# Patient Record
Sex: MALE | Race: WHITE | NOT HISPANIC OR LATINO | URBAN - METROPOLITAN AREA
[De-identification: names, ages, dates, MRNs, and addresses within clinical notes are randomized per-mention and may not be internally consistent; named-entity substitution may affect disease eponyms.]

---

## 2018-02-05 ENCOUNTER — CONSULTATION (OUTPATIENT)
Dept: URBAN - METROPOLITAN AREA CLINIC 19 | Facility: CLINIC | Age: 68
End: 2018-02-05

## 2018-02-05 DIAGNOSIS — H35.371: ICD-10-CM

## 2018-02-05 DIAGNOSIS — H35.412: ICD-10-CM

## 2018-02-05 DIAGNOSIS — H43.812: ICD-10-CM

## 2018-02-05 DIAGNOSIS — H33.021: ICD-10-CM

## 2018-02-05 PROCEDURE — 92226 OPHTHALMOSCOPY (SUB): CPT

## 2018-02-05 PROCEDURE — 92014 COMPRE OPH EXAM EST PT 1/>: CPT

## 2018-02-05 PROCEDURE — G8427 DOCREV CUR MEDS BY ELIG CLIN: HCPCS

## 2018-02-05 PROCEDURE — 92134 CPTRZ OPH DX IMG PST SGM RTA: CPT

## 2018-02-05 PROCEDURE — 1036F TOBACCO NON-USER: CPT

## 2018-02-05 ASSESSMENT — TONOMETRY
OS_IOP_MMHG: 12
OD_IOP_MMHG: 13

## 2018-02-05 ASSESSMENT — VISUAL ACUITY
OD_CC: 20/40+3
OS_PH: 20/30-2
OS_CC: 20/50

## 2018-11-19 ENCOUNTER — FOLLOW UP (OUTPATIENT)
Dept: URBAN - METROPOLITAN AREA CLINIC 19 | Facility: CLINIC | Age: 68
End: 2018-11-19

## 2018-11-19 DIAGNOSIS — H25.812: ICD-10-CM

## 2018-11-19 DIAGNOSIS — H35.362: ICD-10-CM

## 2018-11-19 DIAGNOSIS — H33.021: ICD-10-CM

## 2018-11-19 DIAGNOSIS — H35.371: ICD-10-CM

## 2018-11-19 DIAGNOSIS — H35.412: ICD-10-CM

## 2018-11-19 DIAGNOSIS — H43.812: ICD-10-CM

## 2018-11-19 PROCEDURE — 92226 OPHTHALMOSCOPY (SUB): CPT

## 2018-11-19 PROCEDURE — G8427 DOCREV CUR MEDS BY ELIG CLIN: HCPCS

## 2018-11-19 PROCEDURE — 92134 CPTRZ OPH DX IMG PST SGM RTA: CPT

## 2018-11-19 PROCEDURE — 1036F TOBACCO NON-USER: CPT

## 2018-11-19 PROCEDURE — 92014 COMPRE OPH EXAM EST PT 1/>: CPT

## 2018-11-19 ASSESSMENT — TONOMETRY
OD_IOP_MMHG: 10
OS_IOP_MMHG: 8

## 2018-11-19 ASSESSMENT — VISUAL ACUITY
OD_CC: 20/40-2
OS_CC: 20/30-2
OS_PH: 20/20

## 2019-12-10 ENCOUNTER — FOLLOW UP (OUTPATIENT)
Dept: URBAN - METROPOLITAN AREA CLINIC 19 | Facility: CLINIC | Age: 69
End: 2019-12-10

## 2019-12-10 DIAGNOSIS — H35.362: ICD-10-CM

## 2019-12-10 DIAGNOSIS — H35.371: ICD-10-CM

## 2019-12-10 DIAGNOSIS — H43.812: ICD-10-CM

## 2019-12-10 DIAGNOSIS — H33.021: ICD-10-CM

## 2019-12-10 DIAGNOSIS — H35.412: ICD-10-CM

## 2019-12-10 PROCEDURE — 92134 CPTRZ OPH DX IMG PST SGM RTA: CPT

## 2019-12-10 PROCEDURE — 92226 OPHTHALMOSCOPY (SUB): CPT

## 2019-12-10 PROCEDURE — 92014 COMPRE OPH EXAM EST PT 1/>: CPT

## 2019-12-10 ASSESSMENT — TONOMETRY
OS_IOP_MMHG: 15
OD_IOP_MMHG: 14

## 2019-12-10 ASSESSMENT — VISUAL ACUITY
OS_CC: 20/30-3
OD_CC: 20/40-2
OS_PH: 20/20-3

## 2020-12-14 ENCOUNTER — FOLLOW UP (OUTPATIENT)
Dept: URBAN - METROPOLITAN AREA CLINIC 19 | Facility: CLINIC | Age: 70
End: 2020-12-14

## 2020-12-14 DIAGNOSIS — H35.362: ICD-10-CM

## 2020-12-14 DIAGNOSIS — H35.371: ICD-10-CM

## 2020-12-14 DIAGNOSIS — H35.412: ICD-10-CM

## 2020-12-14 DIAGNOSIS — H26.491: ICD-10-CM

## 2020-12-14 DIAGNOSIS — H43.812: ICD-10-CM

## 2020-12-14 DIAGNOSIS — H33.021: ICD-10-CM

## 2020-12-14 PROCEDURE — 92134 CPTRZ OPH DX IMG PST SGM RTA: CPT

## 2020-12-14 PROCEDURE — 92014 COMPRE OPH EXAM EST PT 1/>: CPT

## 2020-12-14 PROCEDURE — 92201 OPSCPY EXTND RTA DRAW UNI/BI: CPT

## 2020-12-14 ASSESSMENT — VISUAL ACUITY
OD_CC: 20/50-2
OS_CC: 20/25-3
OS_PH: 20/25

## 2020-12-14 ASSESSMENT — TONOMETRY
OD_IOP_MMHG: 15
OS_IOP_MMHG: 14

## 2021-04-28 ENCOUNTER — INPATIENT (INPATIENT)
Facility: HOSPITAL | Age: 71
LOS: 1 days | Discharge: HOME | DRG: 871 | End: 2021-04-30
Attending: HOSPITALIST | Admitting: STUDENT IN AN ORGANIZED HEALTH CARE EDUCATION/TRAINING PROGRAM
Payer: MEDICARE

## 2021-04-28 VITALS
WEIGHT: 190.04 LBS | HEART RATE: 114 BPM | TEMPERATURE: 100 F | DIASTOLIC BLOOD PRESSURE: 58 MMHG | RESPIRATION RATE: 20 BRPM | SYSTOLIC BLOOD PRESSURE: 84 MMHG | OXYGEN SATURATION: 94 %

## 2021-04-28 LAB
ALBUMIN SERPL ELPH-MCNC: 3.6 G/DL — SIGNIFICANT CHANGE UP (ref 3.4–5)
ALP SERPL-CCNC: 77 U/L — SIGNIFICANT CHANGE UP (ref 40–120)
ALT FLD-CCNC: 49 U/L — HIGH (ref 12–42)
ANION GAP SERPL CALC-SCNC: 11 MMOL/L — SIGNIFICANT CHANGE UP (ref 9–16)
APPEARANCE UR: CLEAR — SIGNIFICANT CHANGE UP
APTT BLD: 30.5 SEC — SIGNIFICANT CHANGE UP (ref 27.5–35.5)
AST SERPL-CCNC: 41 U/L — HIGH (ref 15–37)
BACTERIA # UR AUTO: PRESENT /HPF
BASOPHILS # BLD AUTO: 0.03 K/UL — SIGNIFICANT CHANGE UP (ref 0–0.2)
BASOPHILS NFR BLD AUTO: 0.3 % — SIGNIFICANT CHANGE UP (ref 0–2)
BILIRUB SERPL-MCNC: 1 MG/DL — SIGNIFICANT CHANGE UP (ref 0.2–1.2)
BILIRUB UR-MCNC: ABNORMAL
BUN SERPL-MCNC: 18 MG/DL — SIGNIFICANT CHANGE UP (ref 7–23)
CALCIUM SERPL-MCNC: 9.8 MG/DL — SIGNIFICANT CHANGE UP (ref 8.5–10.5)
CHLORIDE SERPL-SCNC: 102 MMOL/L — SIGNIFICANT CHANGE UP (ref 96–108)
CO2 SERPL-SCNC: 27 MMOL/L — SIGNIFICANT CHANGE UP (ref 22–31)
COLOR SPEC: YELLOW — SIGNIFICANT CHANGE UP
COMMENT - URINE: SIGNIFICANT CHANGE UP
CREAT SERPL-MCNC: 1.69 MG/DL — HIGH (ref 0.5–1.3)
DIFF PNL FLD: ABNORMAL
EOSINOPHIL # BLD AUTO: 0 K/UL — SIGNIFICANT CHANGE UP (ref 0–0.5)
EOSINOPHIL NFR BLD AUTO: 0 % — SIGNIFICANT CHANGE UP (ref 0–6)
EPI CELLS # UR: SIGNIFICANT CHANGE UP /HPF (ref 0–5)
GLUCOSE SERPL-MCNC: 142 MG/DL — HIGH (ref 70–99)
GLUCOSE UR QL: NEGATIVE — SIGNIFICANT CHANGE UP
HCT VFR BLD CALC: 45.2 % — SIGNIFICANT CHANGE UP (ref 39–50)
HGB BLD-MCNC: 15.6 G/DL — SIGNIFICANT CHANGE UP (ref 13–17)
HYALINE CASTS # UR AUTO: SIGNIFICANT CHANGE UP /LPF (ref 0–2)
IMM GRANULOCYTES NFR BLD AUTO: 0.5 % — SIGNIFICANT CHANGE UP (ref 0–1.5)
INR BLD: 1.63 — HIGH (ref 0.88–1.16)
KETONES UR-MCNC: NEGATIVE — SIGNIFICANT CHANGE UP
LACTATE SERPL-SCNC: 3.1 MMOL/L — HIGH (ref 0.4–2)
LACTATE SERPL-SCNC: 3.4 MMOL/L — HIGH (ref 0.4–2)
LACTATE SERPL-SCNC: 4.9 MMOL/L — CRITICAL HIGH (ref 0.4–2)
LEUKOCYTE ESTERASE UR-ACNC: ABNORMAL
LYMPHOCYTES # BLD AUTO: 0.4 K/UL — LOW (ref 1–3.3)
LYMPHOCYTES # BLD AUTO: 3.9 % — LOW (ref 13–44)
MAGNESIUM SERPL-MCNC: 1.5 MG/DL — LOW (ref 1.6–2.6)
MCHC RBC-ENTMCNC: 31.3 PG — SIGNIFICANT CHANGE UP (ref 27–34)
MCHC RBC-ENTMCNC: 34.5 GM/DL — SIGNIFICANT CHANGE UP (ref 32–36)
MCV RBC AUTO: 90.6 FL — SIGNIFICANT CHANGE UP (ref 80–100)
MONOCYTES # BLD AUTO: 0.28 K/UL — SIGNIFICANT CHANGE UP (ref 0–0.9)
MONOCYTES NFR BLD AUTO: 2.7 % — SIGNIFICANT CHANGE UP (ref 2–14)
NEUTROPHILS # BLD AUTO: 9.46 K/UL — HIGH (ref 1.8–7.4)
NEUTROPHILS NFR BLD AUTO: 92.6 % — HIGH (ref 43–77)
NITRITE UR-MCNC: POSITIVE
NRBC # BLD: 0 /100 WBCS — SIGNIFICANT CHANGE UP (ref 0–0)
PH UR: 6 — SIGNIFICANT CHANGE UP (ref 5–8)
PLATELET # BLD AUTO: 120 K/UL — LOW (ref 150–400)
POTASSIUM SERPL-MCNC: 3 MMOL/L — LOW (ref 3.5–5.3)
POTASSIUM SERPL-SCNC: 3 MMOL/L — LOW (ref 3.5–5.3)
PROT SERPL-MCNC: 6.8 G/DL — SIGNIFICANT CHANGE UP (ref 6.4–8.2)
PROT UR-MCNC: 30 MG/DL
PROTHROM AB SERPL-ACNC: 19.1 SEC — HIGH (ref 10.6–13.6)
RBC # BLD: 4.99 M/UL — SIGNIFICANT CHANGE UP (ref 4.2–5.8)
RBC # FLD: 12.9 % — SIGNIFICANT CHANGE UP (ref 10.3–14.5)
RBC CASTS # UR COMP ASSIST: ABNORMAL /HPF
SARS-COV-2 RNA SPEC QL NAA+PROBE: SIGNIFICANT CHANGE UP
SODIUM SERPL-SCNC: 140 MMOL/L — SIGNIFICANT CHANGE UP (ref 132–145)
SP GR SPEC: 1.02 — SIGNIFICANT CHANGE UP (ref 1–1.03)
TROPONIN I SERPL-MCNC: 0.35 NG/ML — HIGH (ref 0.02–0.06)
UROBILINOGEN FLD QL: 0.2 E.U./DL — SIGNIFICANT CHANGE UP
WBC # BLD: 10.22 K/UL — SIGNIFICANT CHANGE UP (ref 3.8–10.5)
WBC # FLD AUTO: 10.22 K/UL — SIGNIFICANT CHANGE UP (ref 3.8–10.5)
WBC UR QL: ABNORMAL /HPF

## 2021-04-28 PROCEDURE — 93010 ELECTROCARDIOGRAM REPORT: CPT

## 2021-04-28 PROCEDURE — 74176 CT ABD & PELVIS W/O CONTRAST: CPT | Mod: 26,MH

## 2021-04-28 PROCEDURE — 99223 1ST HOSP IP/OBS HIGH 75: CPT | Mod: GC

## 2021-04-28 PROCEDURE — 71045 X-RAY EXAM CHEST 1 VIEW: CPT | Mod: 26

## 2021-04-28 PROCEDURE — 99291 CRITICAL CARE FIRST HOUR: CPT

## 2021-04-28 RX ORDER — CEFTRIAXONE 500 MG/1
1000 INJECTION, POWDER, FOR SOLUTION INTRAMUSCULAR; INTRAVENOUS ONCE
Refills: 0 | Status: COMPLETED | OUTPATIENT
Start: 2021-04-28 | End: 2021-04-28

## 2021-04-28 RX ORDER — VANCOMYCIN HCL 1 G
1500 VIAL (EA) INTRAVENOUS ONCE
Refills: 0 | Status: DISCONTINUED | OUTPATIENT
Start: 2021-04-28 | End: 2021-04-28

## 2021-04-28 RX ORDER — VANCOMYCIN HCL 1 G
1500 VIAL (EA) INTRAVENOUS ONCE
Refills: 0 | Status: COMPLETED | OUTPATIENT
Start: 2021-04-28 | End: 2021-04-28

## 2021-04-28 RX ORDER — CEFTRIAXONE 500 MG/1
1 INJECTION, POWDER, FOR SOLUTION INTRAMUSCULAR; INTRAVENOUS ONCE
Refills: 0 | Status: DISCONTINUED | OUTPATIENT
Start: 2021-04-28 | End: 2021-04-28

## 2021-04-28 RX ORDER — SODIUM CHLORIDE 9 MG/ML
2700 INJECTION INTRAMUSCULAR; INTRAVENOUS; SUBCUTANEOUS ONCE
Refills: 0 | Status: COMPLETED | OUTPATIENT
Start: 2021-04-28 | End: 2021-04-28

## 2021-04-28 RX ORDER — SODIUM CHLORIDE 9 MG/ML
2000 INJECTION INTRAMUSCULAR; INTRAVENOUS; SUBCUTANEOUS ONCE
Refills: 0 | Status: COMPLETED | OUTPATIENT
Start: 2021-04-28 | End: 2021-04-28

## 2021-04-28 RX ORDER — ACETAMINOPHEN 500 MG
975 TABLET ORAL ONCE
Refills: 0 | Status: COMPLETED | OUTPATIENT
Start: 2021-04-28 | End: 2021-04-28

## 2021-04-28 RX ORDER — SODIUM CHLORIDE 9 MG/ML
1000 INJECTION INTRAMUSCULAR; INTRAVENOUS; SUBCUTANEOUS ONCE
Refills: 0 | Status: COMPLETED | OUTPATIENT
Start: 2021-04-28 | End: 2021-04-28

## 2021-04-28 RX ORDER — MAGNESIUM SULFATE 500 MG/ML
2 VIAL (ML) INJECTION ONCE
Refills: 0 | Status: COMPLETED | OUTPATIENT
Start: 2021-04-28 | End: 2021-04-28

## 2021-04-28 RX ORDER — POTASSIUM CHLORIDE 20 MEQ
40 PACKET (EA) ORAL ONCE
Refills: 0 | Status: COMPLETED | OUTPATIENT
Start: 2021-04-28 | End: 2021-04-28

## 2021-04-28 RX ORDER — VANCOMYCIN HCL 1 G
1500 VIAL (EA) INTRAVENOUS EVERY 12 HOURS
Refills: 0 | Status: DISCONTINUED | OUTPATIENT
Start: 2021-04-28 | End: 2021-04-28

## 2021-04-28 RX ORDER — PIPERACILLIN AND TAZOBACTAM 4; .5 G/20ML; G/20ML
3.38 INJECTION, POWDER, LYOPHILIZED, FOR SOLUTION INTRAVENOUS ONCE
Refills: 0 | Status: COMPLETED | OUTPATIENT
Start: 2021-04-28 | End: 2021-04-28

## 2021-04-28 RX ADMIN — PIPERACILLIN AND TAZOBACTAM 200 GRAM(S): 4; .5 INJECTION, POWDER, LYOPHILIZED, FOR SOLUTION INTRAVENOUS at 19:20

## 2021-04-28 RX ADMIN — SODIUM CHLORIDE 2700 MILLILITER(S): 9 INJECTION INTRAMUSCULAR; INTRAVENOUS; SUBCUTANEOUS at 15:40

## 2021-04-28 RX ADMIN — Medication 2 GRAM(S): at 17:52

## 2021-04-28 RX ADMIN — CEFTRIAXONE 100 MILLIGRAM(S): 500 INJECTION, POWDER, FOR SOLUTION INTRAMUSCULAR; INTRAVENOUS at 15:40

## 2021-04-28 RX ADMIN — Medication 50 GRAM(S): at 17:39

## 2021-04-28 RX ADMIN — CEFTRIAXONE 1000 MILLIGRAM(S): 500 INJECTION, POWDER, FOR SOLUTION INTRAMUSCULAR; INTRAVENOUS at 16:58

## 2021-04-28 RX ADMIN — Medication 975 MILLIGRAM(S): at 16:00

## 2021-04-28 RX ADMIN — Medication 975 MILLIGRAM(S): at 16:58

## 2021-04-28 RX ADMIN — Medication 500 MILLIGRAM(S): at 17:09

## 2021-04-28 RX ADMIN — SODIUM CHLORIDE 2000 MILLILITER(S): 9 INJECTION INTRAMUSCULAR; INTRAVENOUS; SUBCUTANEOUS at 20:20

## 2021-04-28 RX ADMIN — Medication 40 MILLIEQUIVALENT(S): at 16:56

## 2021-04-28 RX ADMIN — SODIUM CHLORIDE 500 MILLILITER(S): 9 INJECTION INTRAMUSCULAR; INTRAVENOUS; SUBCUTANEOUS at 21:55

## 2021-04-28 RX ADMIN — SODIUM CHLORIDE 2700 MILLILITER(S): 9 INJECTION INTRAMUSCULAR; INTRAVENOUS; SUBCUTANEOUS at 17:01

## 2021-04-28 RX ADMIN — Medication 1500 MILLIGRAM(S): at 17:10

## 2021-04-28 NOTE — ED PROVIDER NOTE - PROGRESS NOTE DETAILS
BP improved.  HR remains elevated.  Subjectively feels better than when he arrived.  Labs and imaging reviewed.  Normal WBC with left shift.  LANNY, elevated INR, troponin elevated.  Lactate 3.4.  Picture consistent with severe sepsis.  Antibiotic coverage expanded to zosyn and vancomycin. On reexamination, sleeping comfortably.  BP remains stable, HR improved.  Pending IVF bolus completion before repeat lactate.  Potassium and magnesium replaced.  Pending abdominal/pelvic CT imaging although the patient does not endorse flank or abdominal pain.  Bedside US with bladder normal size with thickened wall.  UO scant since arrival. On reexamination, sleeping comfortably.  BP remains stable, HR improved.  Pending IVF bolus completion before repeat lactate.  Potassium and magnesium replaced.  Pending abdominal/pelvic CT imaging although the patient does not endorse flank or abdominal pain.  Bedside US with bladder normal size with thickened wall.  UO scant since arrival.  MAP 75.  Will contact ICU for Tele admission. Reviewed with Dr. Garza.  Accepted to [facility] by Dr. Smalls

## 2021-04-28 NOTE — ED ADULT NURSE NOTE - OBJECTIVE STATEMENT
BIBA c/o fatigue, weakness, fevers and chills/rigors since this morning. as per daughter, acting slightly more altered. +pt appears comfortable on stretcher, in NAD and will continue to monitor. +SIRS critieria met, protocol initiated and followed

## 2021-04-28 NOTE — ED PROVIDER NOTE - OBJECTIVE STATEMENT
69 y/o M with PMHx of BPH (recently catheterized for urinary retention; Catheter removed 2 days ago and covered with Bactrim; Also taking 0.5mg Finasteride) presents to the ED for fevers, chills, and weakness. Pt woke up this morning with fevers, chills, confusion, weakness, and lack of energy. His daughter called EMS after he seemed disoriented on the phone. Pt denies pertinent PMHx, known sick contacts, and recent hospitalizations. He received the 2nd dose of the COVID-19 vaccine 2 weeks ago. Pt was in his normal state of health yesterday. Pt met sepsis criteria and sepsis code was called on arrival. As per EMS, Pt was tachycardic and hypotensive in the field without AMS.

## 2021-04-28 NOTE — ED PROVIDER NOTE - ENMT, MLM
Airway patent, Nasal mucosa clear. Mouth with normal mucosa. Airway patent, Nasal mucosa clear. Mouth with normal mucosa.  Moist mucous membranes.

## 2021-04-28 NOTE — ED ADULT TRIAGE NOTE - CHIEF COMPLAINT QUOTE
Patient complaining of weakness/fevers, recently discharged from the hospital with BPH/possible kidney stone? had a catheter placed and removed this past Monday by his urologist and hasn't been able to completely void

## 2021-04-28 NOTE — ED PROVIDER NOTE - CONSTITUTIONAL, MLM
normal... Well appearing, awake, alert, oriented to person, place, time/situation and in no apparent distress. Weak appearing, awake, alert, oriented to person, place, time/situation and in no apparent distress.

## 2021-04-28 NOTE — ED PROVIDER NOTE - NEURO NEGATIVE STATEMENT, MLM
no loss of consciousness, no gait abnormality, no headache, no sensory deficits, and no weakness. no loss of consciousness, no gait abnormality, no sensory deficits

## 2021-04-28 NOTE — ED PROVIDER NOTE - CLINICAL SUMMARY MEDICAL DECISION MAKING FREE TEXT BOX
69 y/o M with PMHx of BPH (recently catheterized for urinary retention and placed on Bactrim) presenting with fevers, chills, fatigue, and generalized weakness. Pt noted to he hypotensive and tachycardic at triage. Code sepsis was triggered. Sepsis orders were placed including weight based fluids, labs, cultures, Antibiotics and imaging. On exam, Pt is noted to be AOx3. Lungs are clear, mucous membranes are moist, and Pt is non-toxic appearing. Pt will likely need admission. Will monitor closely and escalate care if need be. 69 y/o M with PMHx of BPH (recently catheterized for urinary retention and placed on Bactrim) presenting with fevers, chills, fatigue, and generalized weakness. Pt noted to he hypotensive and tachycardic at triage. Code sepsis was triggered. Sepsis orders were placed including weight based fluids, labs, cultures, Antibiotics and imaging. On exam, Pt is noted to be AOx3. Lungs are clear, mucous membranes are moist, and is weak but non toxic appearing. Placed on cardiac monitor.  Likely admission for presumed urosepsis.

## 2021-04-28 NOTE — ED PROVIDER NOTE - NEUROLOGICAL, MLM
Alert and oriented, no focal deficits, no motor or sensory deficits. Alert and oriented, no focal deficits, no motor or sensory deficits.  Clear speech, no truncal ataxia.  Answers questions appropriately.

## 2021-04-29 DIAGNOSIS — R63.8 OTHER SYMPTOMS AND SIGNS CONCERNING FOOD AND FLUID INTAKE: ICD-10-CM

## 2021-04-29 DIAGNOSIS — R77.8 OTHER SPECIFIED ABNORMALITIES OF PLASMA PROTEINS: ICD-10-CM

## 2021-04-29 DIAGNOSIS — R19.7 DIARRHEA, UNSPECIFIED: ICD-10-CM

## 2021-04-29 DIAGNOSIS — N39.0 URINARY TRACT INFECTION, SITE NOT SPECIFIED: ICD-10-CM

## 2021-04-29 DIAGNOSIS — N40.0 BENIGN PROSTATIC HYPERPLASIA WITHOUT LOWER URINARY TRACT SYMPTOMS: ICD-10-CM

## 2021-04-29 DIAGNOSIS — N17.9 ACUTE KIDNEY FAILURE, UNSPECIFIED: ICD-10-CM

## 2021-04-29 DIAGNOSIS — A41.9 SEPSIS, UNSPECIFIED ORGANISM: ICD-10-CM

## 2021-04-29 DIAGNOSIS — R41.82 ALTERED MENTAL STATUS, UNSPECIFIED: ICD-10-CM

## 2021-04-29 DIAGNOSIS — D69.6 THROMBOCYTOPENIA, UNSPECIFIED: ICD-10-CM

## 2021-04-29 LAB
ALBUMIN SERPL ELPH-MCNC: 2.9 G/DL — LOW (ref 3.3–5)
ALP SERPL-CCNC: 63 U/L — SIGNIFICANT CHANGE UP (ref 40–120)
ALT FLD-CCNC: 51 U/L — HIGH (ref 10–45)
ANION GAP SERPL CALC-SCNC: 9 MMOL/L — SIGNIFICANT CHANGE UP (ref 5–17)
ANISOCYTOSIS BLD QL: SLIGHT — SIGNIFICANT CHANGE UP
AST SERPL-CCNC: 71 U/L — HIGH (ref 10–40)
BASOPHILS # BLD AUTO: 0 K/UL — SIGNIFICANT CHANGE UP (ref 0–0.2)
BASOPHILS NFR BLD AUTO: 0 % — SIGNIFICANT CHANGE UP (ref 0–2)
BILIRUB SERPL-MCNC: 0.7 MG/DL — SIGNIFICANT CHANGE UP (ref 0.2–1.2)
BUN SERPL-MCNC: 17 MG/DL — SIGNIFICANT CHANGE UP (ref 7–23)
BURR CELLS BLD QL SMEAR: PRESENT — SIGNIFICANT CHANGE UP
CALCIUM SERPL-MCNC: 8.2 MG/DL — LOW (ref 8.4–10.5)
CHLORIDE SERPL-SCNC: 112 MMOL/L — HIGH (ref 96–108)
CK MB CFR SERPL CALC: 3.8 NG/ML — SIGNIFICANT CHANGE UP (ref 0–6.7)
CK SERPL-CCNC: 70 U/L — SIGNIFICANT CHANGE UP (ref 30–200)
CO2 SERPL-SCNC: 20 MMOL/L — LOW (ref 22–31)
CREAT SERPL-MCNC: 1.16 MG/DL — SIGNIFICANT CHANGE UP (ref 0.5–1.3)
CULTURE RESULTS: SIGNIFICANT CHANGE UP
EOSINOPHIL # BLD AUTO: 0.16 K/UL — SIGNIFICANT CHANGE UP (ref 0–0.5)
EOSINOPHIL NFR BLD AUTO: 0.9 % — SIGNIFICANT CHANGE UP (ref 0–6)
GIANT PLATELETS BLD QL SMEAR: PRESENT — SIGNIFICANT CHANGE UP
GLUCOSE SERPL-MCNC: 111 MG/DL — HIGH (ref 70–99)
HCT VFR BLD CALC: 40.5 % — SIGNIFICANT CHANGE UP (ref 39–50)
HGB BLD-MCNC: 13 G/DL — SIGNIFICANT CHANGE UP (ref 13–17)
HYPOCHROMIA BLD QL: SLIGHT — SIGNIFICANT CHANGE UP
LACTATE SERPL-SCNC: 1.7 MMOL/L — SIGNIFICANT CHANGE UP (ref 0.5–2)
LYMPHOCYTES # BLD AUTO: 0.81 K/UL — LOW (ref 1–3.3)
LYMPHOCYTES # BLD AUTO: 4.6 % — LOW (ref 13–44)
MAGNESIUM SERPL-MCNC: 1.6 MG/DL — SIGNIFICANT CHANGE UP (ref 1.6–2.6)
MANUAL SMEAR VERIFICATION: SIGNIFICANT CHANGE UP
MCHC RBC-ENTMCNC: 30.9 PG — SIGNIFICANT CHANGE UP (ref 27–34)
MCHC RBC-ENTMCNC: 32.1 GM/DL — SIGNIFICANT CHANGE UP (ref 32–36)
MCV RBC AUTO: 96.2 FL — SIGNIFICANT CHANGE UP (ref 80–100)
MONOCYTES # BLD AUTO: 0.32 K/UL — SIGNIFICANT CHANGE UP (ref 0–0.9)
MONOCYTES NFR BLD AUTO: 1.8 % — LOW (ref 2–14)
NEUTROPHILS # BLD AUTO: 16.3 K/UL — HIGH (ref 1.8–7.4)
NEUTROPHILS NFR BLD AUTO: 88.2 % — HIGH (ref 43–77)
NEUTS BAND # BLD: 4.5 % — SIGNIFICANT CHANGE UP (ref 0–8)
OVALOCYTES BLD QL SMEAR: SLIGHT — SIGNIFICANT CHANGE UP
PHOSPHATE SERPL-MCNC: 2.4 MG/DL — LOW (ref 2.5–4.5)
PLAT MORPH BLD: ABNORMAL
PLATELET # BLD AUTO: 93 K/UL — LOW (ref 150–400)
POIKILOCYTOSIS BLD QL AUTO: SLIGHT — SIGNIFICANT CHANGE UP
POLYCHROMASIA BLD QL SMEAR: SLIGHT — SIGNIFICANT CHANGE UP
POTASSIUM SERPL-MCNC: 4.3 MMOL/L — SIGNIFICANT CHANGE UP (ref 3.5–5.3)
POTASSIUM SERPL-SCNC: 4.3 MMOL/L — SIGNIFICANT CHANGE UP (ref 3.5–5.3)
PROT SERPL-MCNC: 5.1 G/DL — LOW (ref 6–8.3)
RBC # BLD: 4.21 M/UL — SIGNIFICANT CHANGE UP (ref 4.2–5.8)
RBC # FLD: 13.4 % — SIGNIFICANT CHANGE UP (ref 10.3–14.5)
RBC BLD AUTO: ABNORMAL
SCHISTOCYTES BLD QL AUTO: SLIGHT — SIGNIFICANT CHANGE UP
SODIUM SERPL-SCNC: 141 MMOL/L — SIGNIFICANT CHANGE UP (ref 135–145)
SPECIMEN SOURCE: SIGNIFICANT CHANGE UP
SPHEROCYTES BLD QL SMEAR: SLIGHT — SIGNIFICANT CHANGE UP
TROPONIN T SERPL-MCNC: 0.02 NG/ML — HIGH (ref 0–0.01)
TROPONIN T SERPL-MCNC: 0.02 NG/ML — HIGH (ref 0–0.01)
WBC # BLD: 17.58 K/UL — HIGH (ref 3.8–10.5)
WBC # FLD AUTO: 17.58 K/UL — HIGH (ref 3.8–10.5)

## 2021-04-29 PROCEDURE — 99233 SBSQ HOSP IP/OBS HIGH 50: CPT | Mod: GC

## 2021-04-29 PROCEDURE — 93010 ELECTROCARDIOGRAM REPORT: CPT

## 2021-04-29 RX ORDER — PIPERACILLIN AND TAZOBACTAM 4; .5 G/20ML; G/20ML
3.38 INJECTION, POWDER, LYOPHILIZED, FOR SOLUTION INTRAVENOUS EVERY 6 HOURS
Refills: 0 | Status: DISCONTINUED | OUTPATIENT
Start: 2021-04-29 | End: 2021-04-29

## 2021-04-29 RX ORDER — PIPERACILLIN AND TAZOBACTAM 4; .5 G/20ML; G/20ML
3.38 INJECTION, POWDER, LYOPHILIZED, FOR SOLUTION INTRAVENOUS ONCE
Refills: 0 | Status: COMPLETED | OUTPATIENT
Start: 2021-04-29 | End: 2021-04-29

## 2021-04-29 RX ORDER — FINASTERIDE 5 MG/1
5 TABLET, FILM COATED ORAL DAILY
Refills: 0 | Status: DISCONTINUED | OUTPATIENT
Start: 2021-04-29 | End: 2021-04-30

## 2021-04-29 RX ORDER — ENOXAPARIN SODIUM 100 MG/ML
40 INJECTION SUBCUTANEOUS DAILY
Refills: 0 | Status: DISCONTINUED | OUTPATIENT
Start: 2021-04-29 | End: 2021-04-30

## 2021-04-29 RX ORDER — PIPERACILLIN AND TAZOBACTAM 4; .5 G/20ML; G/20ML
4.5 INJECTION, POWDER, LYOPHILIZED, FOR SOLUTION INTRAVENOUS EVERY 6 HOURS
Refills: 0 | Status: DISCONTINUED | OUTPATIENT
Start: 2021-04-29 | End: 2021-04-30

## 2021-04-29 RX ADMIN — FINASTERIDE 5 MILLIGRAM(S): 5 TABLET, FILM COATED ORAL at 11:33

## 2021-04-29 RX ADMIN — PIPERACILLIN AND TAZOBACTAM 200 GRAM(S): 4; .5 INJECTION, POWDER, LYOPHILIZED, FOR SOLUTION INTRAVENOUS at 18:21

## 2021-04-29 RX ADMIN — PIPERACILLIN AND TAZOBACTAM 200 GRAM(S): 4; .5 INJECTION, POWDER, LYOPHILIZED, FOR SOLUTION INTRAVENOUS at 23:59

## 2021-04-29 RX ADMIN — ENOXAPARIN SODIUM 40 MILLIGRAM(S): 100 INJECTION SUBCUTANEOUS at 11:32

## 2021-04-29 RX ADMIN — PIPERACILLIN AND TAZOBACTAM 200 GRAM(S): 4; .5 INJECTION, POWDER, LYOPHILIZED, FOR SOLUTION INTRAVENOUS at 09:26

## 2021-04-29 RX ADMIN — PIPERACILLIN AND TAZOBACTAM 200 GRAM(S): 4; .5 INJECTION, POWDER, LYOPHILIZED, FOR SOLUTION INTRAVENOUS at 11:33

## 2021-04-29 RX ADMIN — Medication 62.5 MILLIMOLE(S): at 10:20

## 2021-04-29 RX ADMIN — PIPERACILLIN AND TAZOBACTAM 200 GRAM(S): 4; .5 INJECTION, POWDER, LYOPHILIZED, FOR SOLUTION INTRAVENOUS at 05:58

## 2021-04-29 NOTE — PROGRESS NOTE ADULT - PROBLEM SELECTOR PLAN 4
Currently A0x3  Most likely in setting of sepsis and UTI  -Will monitor - Trend LFTs - possibly acute i/s/o sepsis vs side effect of bactrim (was on bactrim right before admission)  - continue to monitor for now    #Transaminitis  - mildly elevated AST/ALT  - likely i/s/o sepsis  - continue to monitor

## 2021-04-29 NOTE — PROGRESS NOTE ADULT - SUBJECTIVE AND OBJECTIVE BOX
CC: Patient is a 70y old  Male who presents with a chief complaint of Sepsis (2021 04:34)      OVERNIGHT EVENTS:    SUBJECTIVE / INTERVAL HPI: Patient seen and examined at bedside.     ROS: negative unless otherwise stated above.    VITAL SIGNS:  Vital Signs Last 24 Hrs  T(C): 37.4 (2021 06:38), Max: 37.8 (2021 15:35)  T(F): 99.3 (2021 06:38), Max: 100.1 (2021 15:35)  HR: 75 (2021 06:38) (74 - 114)  BP: 105/68 (2021 06:38) (84/58 - 118/68)  BP(mean): --  RR: 18 (2021 06:38) (18 - 20)  SpO2: 97% (2021 06:38) (94% - 97%)    PHYSICAL EXAM:    General: WDWN  HEENT: NC/AT; PERRL, anicteric sclera; MMM  Neck: supple  Cardiovascular: +S1/S2; RRR  Respiratory: CTA B/L; no W/R/R  Gastrointestinal: soft, NT/ND; +BSx4  Extremities: WWP; no edema, clubbing or cyanosis  Vascular: 2+ radial, DP/PT pulses B/L  Neurological: AAOx3; no focal deficits    MEDICATIONS:  MEDICATIONS  (STANDING):  enoxaparin Injectable 40 milliGRAM(s) SubCutaneous daily  finasteride 5 milliGRAM(s) Oral daily  piperacillin/tazobactam IVPB.. 4.5 Gram(s) IV Intermittent every 6 hours    MEDICATIONS  (PRN):      ALLERGIES:  Allergies    No Known Allergies    Intolerances        LABS:                        13.0   17.58 )-----------( 93       ( 2021 07:05 )             40.5     04-29    141  |  112<H>  |  17  ----------------------------<  111<H>  4.3   |  20<L>  |  1.16    Ca    8.2<L>      2021 07:05  Phos  2.4     -  Mg     1.6     -29    TPro  5.1<L>  /  Alb  2.9<L>  /  TBili  0.7  /  DBili  x   /  AST  71<H>  /  ALT  51<H>  /  AlkPhos  63  04-29    PT/INR - ( 2021 15:57 )   PT: 19.1 sec;   INR: 1.63          PTT - ( 2021 15:57 )  PTT:30.5 sec  Urinalysis Basic - ( 2021 17:02 )    Color: Yellow / Appearance: Clear / S.025 / pH: x  Gluc: x / Ketone: NEGATIVE  / Bili: Small / Urobili: 0.2 E.U./dL   Blood: x / Protein: 30 mg/dL / Nitrite: POSITIVE   Leuk Esterase: Moderate / RBC: 5-10 /HPF / WBC Many /HPF   Sq Epi: x / Non Sq Epi: 0-5 /HPF / Bacteria: Present /HPF      CAPILLARY BLOOD GLUCOSE          RADIOLOGY & ADDITIONAL TESTS: Reviewed. CC: Patient is a 70y old  Male who presents with a chief complaint of Sepsis (2021 04:34)      OVERNIGHT EVENTS: No overnight events reported.     SUBJECTIVE / INTERVAL HPI: Patient seen and examined at bedside. Patient states that is overall feeling much better than yesterday. Patient states that he has mild dysuria and increased frequency this morning with no urgency. He also reports 3 episodes of watery, nonbloody diarrhea this morning and 2 episodes in the ED last night after receiving IV medications. Patient states that he has had BPH for 7 years, sees his urologist Dr. Denton Justin every 6 months and has not had a similar episode in the past. Denies fever/chills, CP, SOB, abd or flank pain, urinary retention, hematuria, incontinence, headache, dizziness or N/V/C     ROS: negative unless otherwise stated above.    VITAL SIGNS:  Vital Signs Last 24 Hrs  T(C): 37.4 (2021 06:38), Max: 37.8 (2021 15:35)  T(F): 99.3 (2021 06:38), Max: 100.1 (2021 15:35)  HR: 75 (2021 06:38) (74 - 114)  BP: 105/68 (2021 06:38) (84/58 - 118/68)  BP(mean): --  RR: 18 (2021 06:38) (18 - 20)  SpO2: 97% (2021 06:38) (94% - 97%)    PHYSICAL EXAM:    General: NAD; well-appearing  HEENT: NC/AT; PERRL, anicteric sclera; MMM  Neck: supple, no thyromegaly or JVD  Cardiovascular: +S1/S2; RRR, no murmurs  Respiratory: CTA B/L; no W/R/R  Gastrointestinal: soft, NT/ND; +BSx4; no CVA tenderness  Extremities: WWP; no edema, clubbing or cyanosis  Vascular: 2+ radial, DP/PT pulses B/L  Neurological: AAOx3; no focal deficits    MEDICATIONS:  MEDICATIONS  (STANDING):  enoxaparin Injectable 40 milliGRAM(s) SubCutaneous daily  finasteride 5 milliGRAM(s) Oral daily  piperacillin/tazobactam IVPB.. 4.5 Gram(s) IV Intermittent every 6 hours    MEDICATIONS  (PRN):      ALLERGIES:  Allergies    No Known Allergies    Intolerances        LABS:                        13.0   17.58 )-----------( 93       ( 2021 07:05 )             40.5         141  |  112<H>  |  17  ----------------------------<  111<H>  4.3   |  20<L>  |  1.16    Ca    8.2<L>      2021 07:05  Phos  2.4       Mg     1.6         TPro  5.1<L>  /  Alb  2.9<L>  /  TBili  0.7  /  DBili  x   /  AST  71<H>  /  ALT  51<H>  /  AlkPhos  63  04-29    PT/INR - ( 2021 15:57 )   PT: 19.1 sec;   INR: 1.63          PTT - ( 2021 15:57 )  PTT:30.5 sec  Urinalysis Basic - ( 2021 17:02 )    Color: Yellow / Appearance: Clear / S.025 / pH: x  Gluc: x / Ketone: NEGATIVE  / Bili: Small / Urobili: 0.2 E.U./dL   Blood: x / Protein: 30 mg/dL / Nitrite: POSITIVE   Leuk Esterase: Moderate / RBC: 5-10 /HPF / WBC Many /HPF   Sq Epi: x / Non Sq Epi: 0-5 /HPF / Bacteria: Present /HPF      CAPILLARY BLOOD GLUCOSE          RADIOLOGY & ADDITIONAL TESTS: Reviewed. CC: Patient is a 70y old  Male who presents with a chief complaint of Sepsis (2021 04:34)      OVERNIGHT EVENTS: No overnight events reported.     SUBJECTIVE / INTERVAL HPI: Patient seen and examined at bedside. Patient states that is overall feeling much better than yesterday. Patient states that he has mild dysuria and increased frequency this morning with no urgency. He also reports 3 episodes of watery, nonbloody diarrhea this morning and 2 episodes in the ED last night after receiving IV medications. Patient states that he has had BPH for 7 years, sees his urologist Dr. Miguel Angel Lamb every 6 months and has not had a similar episode in the past. Denies fever/chills, CP, SOB, abd or flank pain, urinary retention, hematuria, incontinence, headache, dizziness or N/V/C     ROS: negative unless otherwise stated above.    VITAL SIGNS:  Vital Signs Last 24 Hrs  T(C): 37.4 (2021 06:38), Max: 37.8 (2021 15:35)  T(F): 99.3 (2021 06:38), Max: 100.1 (2021 15:35)  HR: 75 (2021 06:38) (74 - 114)  BP: 105/68 (2021 06:38) (84/58 - 118/68)  BP(mean): --  RR: 18 (2021 06:38) (18 - 20)  SpO2: 97% (2021 06:38) (94% - 97%)    PHYSICAL EXAM:    General: NAD; well-appearing  HEENT: NC/AT; PERRL, anicteric sclera; MMM  Neck: supple, no thyromegaly or JVD  Cardiovascular: +S1/S2; RRR, no murmurs  Respiratory: CTA B/L; no W/R/R  Gastrointestinal: soft, NT/ND; +BSx4; no CVA tenderness  Extremities: WWP; no edema, clubbing or cyanosis  Vascular: 2+ radial, DP/PT pulses B/L  Neurological: AAOx3; no focal deficits    MEDICATIONS:  MEDICATIONS  (STANDING):  enoxaparin Injectable 40 milliGRAM(s) SubCutaneous daily  finasteride 5 milliGRAM(s) Oral daily  piperacillin/tazobactam IVPB.. 4.5 Gram(s) IV Intermittent every 6 hours    MEDICATIONS  (PRN):      ALLERGIES:  Allergies    No Known Allergies    Intolerances        LABS:                        13.0   17.58 )-----------( 93       ( 2021 07:05 )             40.5     04    141  |  112<H>  |  17  ----------------------------<  111<H>  4.3   |  20<L>  |  1.16    Ca    8.2<L>      2021 07:05  Phos  2.4       Mg     1.6         TPro  5.1<L>  /  Alb  2.9<L>  /  TBili  0.7  /  DBili  x   /  AST  71<H>  /  ALT  51<H>  /  AlkPhos  63  04-29    PT/INR - ( 2021 15:57 )   PT: 19.1 sec;   INR: 1.63          PTT - ( 2021 15:57 )  PTT:30.5 sec  Urinalysis Basic - ( 2021 17:02 )    Color: Yellow / Appearance: Clear / S.025 / pH: x  Gluc: x / Ketone: NEGATIVE  / Bili: Small / Urobili: 0.2 E.U./dL   Blood: x / Protein: 30 mg/dL / Nitrite: POSITIVE   Leuk Esterase: Moderate / RBC: 5-10 /HPF / WBC Many /HPF   Sq Epi: x / Non Sq Epi: 0-5 /HPF / Bacteria: Present /HPF      CAPILLARY BLOOD GLUCOSE          RADIOLOGY & ADDITIONAL TESTS: Reviewed. CC: Patient is a 70y old  Male who presents with a chief complaint of Sepsis (2021 04:34)      OVERNIGHT EVENTS: No overnight events reported.     SUBJECTIVE / INTERVAL HPI: Patient seen and examined at bedside. Patient states that is overall feeling much better than yesterday. Patient states that he has mild dysuria and increased frequency this morning with no urgency. He also reports 3 episodes of watery, nonbloody diarrhea this morning and 2 episodes in the ED last night after receiving IV medications. Patient states that he has had BPH for 7 years, sees his urologist Dr. Miguel Angel Lamb every 6 months and has not had a similar episode in the past. Denies fever/chills, CP, SOB, abd or flank pain, urinary retention, hematuria, incontinence, headache, dizziness or N/V/C     ROS: negative unless otherwise stated above.    VITAL SIGNS:  Vital Signs Last 24 Hrs  T(C): 37.4 (2021 06:38), Max: 37.8 (2021 15:35)  T(F): 99.3 (2021 06:38), Max: 100.1 (2021 15:35)  HR: 75 (2021 06:38) (74 - 114)  BP: 105/68 (2021 06:38) (84/58 - 118/68)  BP(mean): --  RR: 18 (2021 06:38) (18 - 20)  SpO2: 97% (2021 06:38) (94% - 97%)    PHYSICAL EXAM:    General: NAD; well-appearing  HEENT: NC/AT; PERRL, anicteric sclera; MMM  Neck: supple, no thyromegaly or JVD  Cardiovascular: +S1/S2; RRR, no murmurs  Respiratory: CTA B/L; no W/R/R  Gastrointestinal: soft, NT/ND; +BSx4; no CVA tenderness  Extremities: WWP; no clubbing or cyanosis; trace edema in b/l LE below knees, mid-shin  Vascular: 2+ radial, DP/PT pulses B/L  Neurological: AAOx3; no focal deficits    MEDICATIONS:  MEDICATIONS  (STANDING):  enoxaparin Injectable 40 milliGRAM(s) SubCutaneous daily  finasteride 5 milliGRAM(s) Oral daily  piperacillin/tazobactam IVPB.. 4.5 Gram(s) IV Intermittent every 6 hours    MEDICATIONS  (PRN):      ALLERGIES:  Allergies    No Known Allergies    Intolerances        LABS:                        13.0   17.58 )-----------( 93       ( 2021 07:05 )             40.5     04-    141  |  112<H>  |  17  ----------------------------<  111<H>  4.3   |  20<L>  |  1.16    Ca    8.2<L>      2021 07:05  Phos  2.4       Mg     1.6         TPro  5.1<L>  /  Alb  2.9<L>  /  TBili  0.7  /  DBili  x   /  AST  71<H>  /  ALT  51<H>  /  AlkPhos  63  29    PT/INR - ( 2021 15:57 )   PT: 19.1 sec;   INR: 1.63          PTT - ( 2021 15:57 )  PTT:30.5 sec  Urinalysis Basic - ( 2021 17:02 )    Color: Yellow / Appearance: Clear / S.025 / pH: x  Gluc: x / Ketone: NEGATIVE  / Bili: Small / Urobili: 0.2 E.U./dL   Blood: x / Protein: 30 mg/dL / Nitrite: POSITIVE   Leuk Esterase: Moderate / RBC: 5-10 /HPF / WBC Many /HPF   Sq Epi: x / Non Sq Epi: 0-5 /HPF / Bacteria: Present /HPF      CAPILLARY BLOOD GLUCOSE          RADIOLOGY & ADDITIONAL TESTS: Reviewed.

## 2021-04-29 NOTE — H&P ADULT - PROBLEM SELECTOR PLAN 3
post-renal in setting of enlarged prostate and decreased UOP vs pre-renal in setting of sepsis  -Urology consult   -Strict Is/Os  -Bladder scan [id number]

## 2021-04-29 NOTE — PROGRESS NOTE ADULT - PROBLEM SELECTOR PLAN 5
-Continue finasteride 5 mg daily post-renal in setting of enlarged prostate and decreased UOP vs pre-renal in setting of sepsis  - Resolved LANNY this AM  -Strict Is/Os  -Bladder scan q6hr RESOLVED  post-renal in setting of enlarged prostate and decreased UOP vs pre-renal in setting of sepsis  - Resolved LANNY this AM  -Strict Is/Os  -Bladder scan q6hr

## 2021-04-29 NOTE — H&P ADULT - HISTORY OF PRESENT ILLNESS
69 y/o M with PMHx of BPH presents to the ED for fevers, chills, and weakness.  Pt woke up this morning with fevers, chills, confusion, weakness, and lack of energy. His daughter called EMS after he seemed disoriented on the phone. Pt denies pertinent PMHx, known sick contacts, and recent hospitalizations. He received the 2nd dose of the COVID-19 vaccine 2 weeks ago. Pt was in his normal state of health yesterday. Pt met sepsis criteria and sepsis code was called on arrival. As per EMS, Pt was tachycardic and hypotensive in the field without AMS    PMH:    PSG:    Family History:    Social History:    ED Course:    Vitals:  Temp: 100.1, HR: 114, BP: 84/58, saturating 94% on RA      Medications:  tylenol 975, ceftriaxone 1 g, magnesium 2 g IV, zosyn 3.375 x 1, klor x 1, vanc 1.5 mg IV, NS 5700 ml     EKG: sinus tachycardia    71 y/o M with PMHx of BPH presents to the ED for fevers, chills, and weakness.  Pt woke up this morning with fevers, chills, confusion, weakness, and lack of energy. His daughter called EMS after he seemed disoriented on the phone.  As per patient, last Friday was not able to urinate for which he went to the ED and was catheterized. The catheter was removed by his urologist Dr. Denton Justin on Monday. Thereafter he developed a fever yesterday to 103, associated with chills.  Patient felt that he was getting progressively weaker  Noticed that his UOP has diminished from normal, otherwise has no urinary symptoms such as dysuria, hematuria, urgency, etc    Denies chest pain, SOB, palpitations, abdominal pain, headaches, dizziness     PMH:  Home medication: finasteride 5 mg  Dr. Denton Justin- urologist  Dr. Pena- PCP    PSG: no previous surgeries    Family History: No pertinent family history     Social History:  Lives alone  Ambulates on own, no HHA or visiting nurse  Denies ETOH, smoking, other drug use     ED Course:    Vitals:  Temp: 100.1, HR: 114, BP: 84/58, saturating 94% on RA      Medications:  tylenol 975, ceftriaxone 1 g, magnesium 2 g IV, zosyn 3.375 x 1, klor x 1, vanc 1.5 mg IV, NS 5700 ml     EKG: sinus tachycardia

## 2021-04-29 NOTE — H&P ADULT - ASSESSMENT
69 y/o M with PMHx of BPH PW: fever, AMS in setting of sepsis  Sepsis most likely 2/2 to UTI   Meets 2/4 SIRS criteria  QSOFA: 2 points= high risk    As patient had recent abx use= pseudomonal risk factor    71 y/o M with PMHx of BPH PW: fever, AMS in setting of sepsis  Sepsis most likely 2/2 to UTI, complicated given recent history of urinary catheter+  CXR clear     Meets 2/4 SIRS criteria  QSOFA: 2 points= high risk    As patient had recent abx use= pseudomonal risk factor   CTAP shows severe prostatomegaly. Patient with LANNY- either pre-renal in setting of sepsis vs post-renal in setting of enlarged prostate (most likely). Will need urology input

## 2021-04-29 NOTE — PROGRESS NOTE ADULT - PROBLEM SELECTOR PLAN 3
post-renal in setting of enlarged prostate and decreased UOP vs pre-renal in setting of sepsis  -Urology consult   -Strict Is/Os  -Bladder scan [id number] post-renal in setting of enlarged prostate and decreased UOP vs pre-renal in setting of sepsis  - Resolved LANNY this AM  -Strict Is/Os  -Bladder scan q6hr Plan as above

## 2021-04-29 NOTE — PATIENT PROFILE ADULT - VISION (WITH CORRECTIVE LENSES IF THE PATIENT USUALLY WEARS THEM):
wears glasses/Normal vision: sees adequately in most situations; can see medication labels, newsprint
EKG in chart
Learning behavioral activities to cope with urges.  For example, distraction and changing routines

## 2021-04-29 NOTE — H&P ADULT - ATTENDING COMMENTS
agree with assessment and plan as documented by resident and student.  --f/u urine cx; continue zosyn today but concerned as wbc increasing today  --f/u diff for neutrophils and bandemia  --repeat EKG and trops   --monitor thrombocytopenia, likely 2/2 sepsis   --AMS improved and patient now a&ox3.

## 2021-04-29 NOTE — PROGRESS NOTE ADULT - ASSESSMENT
69 y/o M with PMHx of BPH PW: fever, AMS in setting of sepsis  Sepsis most likely 2/2 to UTI, complicated given recent history of urinary catheter+  CXR clear     Meets 2/4 SIRS criteria  QSOFA: 2 points= high risk    As patient had recent abx use= pseudomonal risk factor   CTAP shows severe prostatomegaly. Patient with LANNY- either pre-renal in setting of sepsis vs post-renal in setting of enlarged prostate (most likely). Will need urology input

## 2021-04-29 NOTE — PROGRESS NOTE ADULT - PROBLEM SELECTOR PLAN 1
Mets 2/4 SIRS criteria   -Start zosyn 3.375 [id number]  -Trend WBC, fever curve  -Tylenol PRN for fever or pain   -F/u micro studies- UC and BC and change abx based on sensitivities   -Trend lactate- f/u AM level Plan as above Mets 2/4 SIRS criteria   -C/w zosyn 4.5mg q6hr  -Trend WBC, fever curve  -Tylenol PRN for fever or pain   -F/u micro studies- UC and BC and change abx based on sensitivities - pending  -Trend lactate- 1.7 AM level on 4/29 Mets 2/4 SIRS criteria   -C/w zosyn 4.5mg q6hr  -Trend WBC, fever curve  -Tylenol PRN for fever or pain   -F/u micro studies- UC and BC and change abx based on sensitivities - pending  -lactate resolved (1.7 AM level on 4/29)

## 2021-04-29 NOTE — PROGRESS NOTE ADULT - PROBLEM SELECTOR PLAN 9
likely secondary to antibiotic IV medications  - monitor F: not indicated  E: replete PRN   N; regular diet     DVT ppx: with enoxaparin     Full Code

## 2021-04-29 NOTE — H&P ADULT - NSHPLABSRESULTS_GEN_ALL_CORE
LABS:   COVID-19 PCR: NotDetec (21 @ 18:04)                          15.6   10.22 )-----------( 120      ( 2021 15:57 )             45.2         140  |  102  |  18  ----------------------------<  142<H>  3.0<L>   |  27  |  1.69<H>    Ca    9.8      2021 15:57  Mg     1.5         TPro  6.8  /  Alb  3.6  /  TBili  1.0  /  DBili  x   /  AST  41<H>  /  ALT  49<H>  /  AlkPhos  77      PT/INR - ( 2021 15:57 )   PT: 19.1 sec;   INR: 1.63          PTT - ( 2021 15:57 )  PTT:30.5 sec  Urinalysis Basic - ( 2021 17:02 )    Color: Yellow / Appearance: Clear / S.025 / pH: x  Gluc: x / Ketone: NEGATIVE  / Bili: Small / Urobili: 0.2 E.U./dL   Blood: x / Protein: 30 mg/dL / Nitrite: POSITIVE   Leuk Esterase: Moderate / RBC: 5-10 /HPF / WBC Many /HPF   Sq Epi: x / Non Sq Epi: 0-5 /HPF / Bacteria: Present /HPF      CARDIAC MARKERS ( 2021 15:58 )  0.354 ng/mL / x     / x     / x     / x          Lactate, Blood: 3.1 mmoL/L ( @ 22:00)  Lactate, Blood: 4.9 mmoL/L ( @ 19:48)    Auto Neutrophil #: 9.46 K/uL *H* [1.80 - 7.40] (21 @ 15:57)  Auto Lymphocyte #: 0.40 K/uL *L* [1.00 - 3.30] (21 @ 15:57)          RADIOLOGY, EKG & ADDITIONAL TESTS: Reviewed.     CTAP:  IMPRESSION:  Limited evaluation of renal parenchyma without intravenous contrast.  1.  Cystic structure in the right kidney, likely reflect simple renal cyst, however intrarenal abscess cannot be excluded on noncontrast study.  2.  Mild left hydroureter and enlarged left periaortic lymph node, may reflect left urinary tract infection/inflammation. Clinical correlation is recommended.  3.  Mild urinary bladder wall thickening may be secondary to chronic outlet obstruction versus cystitis. Marked prostatomegaly.    Clear CXR

## 2021-04-29 NOTE — H&P ADULT - NSHPPHYSICALEXAM_GEN_ALL_CORE
VITALS:   T(C): 36.7 (04-29-21 @ 03:35), Max: 37.8 (04-28-21 @ 15:35)  HR: 74 (04-29-21 @ 03:35) (74 - 114)  BP: 112/76 (04-29-21 @ 03:35) (84/58 - 118/68)  RR: 19 (04-29-21 @ 03:35) (18 - 20)  SpO2: 97% (04-29-21 @ 03:35) (94% - 97%)    GENERAL: NAD, lying in bed comfortably  HEAD:  Atraumatic, Normocephalic  EYES: EOMI, PERRLA, conjunctiva and sclera clear  ENT: Moist mucous membranes  NECK: Supple, No JVD  CHEST/LUNG: Clear to auscultation bilaterally; No rales, rhonchi, wheezing, or rubs. Unlabored respirations  HEART: Regular rate and rhythm; No murmurs, rubs, or gallops  ABDOMEN: BSx4; Soft, nontender, nondistended  EXTREMITIES:  2+ Peripheral Pulses, brisk capillary refill. No clubbing, cyanosis, or edema  NERVOUS SYSTEM:  A&Ox3, no focal deficits   SKIN: No rashes or lesions VITALS:   T(C): 36.7 (04-29-21 @ 03:35), Max: 37.8 (04-28-21 @ 15:35)  HR: 74 (04-29-21 @ 03:35) (74 - 114)  BP: 112/76 (04-29-21 @ 03:35) (84/58 - 118/68)  RR: 19 (04-29-21 @ 03:35) (18 - 20)  SpO2: 97% (04-29-21 @ 03:35) (94% - 97%)    GENERAL: NAD, lying in bed comfortably  HEAD:  Atraumatic, Normocephalic  EYES: EOMI, PERRLA, conjunctiva and sclera clear  ENT: Moist mucous membranes  NECK: Supple, No JVD  CHEST/LUNG: Clear to auscultation bilaterally; No rales, rhonchi, wheezing, or rubs. Unlabored respirations  HEART: Regular rate and rhythm; No murmurs, rubs, or gallops  ABDOMEN: BSx4; Soft, nontender, nondistended, NO CVA tenderness   EXTREMITIES:  2+ Peripheral Pulses, brisk capillary refill. No clubbing, cyanosis, or edema  NERVOUS SYSTEM:  A&Ox3, no focal deficits   SKIN: No rashes or lesions

## 2021-04-29 NOTE — PROGRESS NOTE ADULT - PROBLEM SELECTOR PLAN 7
likely in setting of stress  - F/u troponin   - F/u CK, CKMB  - F/u repeat EKG -Continue finasteride 5 mg daily -Continue finasteride 5 mg daily  - called outpatient urologists office for collateral - office said they would call back

## 2021-04-29 NOTE — PROGRESS NOTE ADULT - PROBLEM SELECTOR PLAN 6
F: not indicated  E: replete PRN   N; regular diet     DVT ppx: with enoxaparin     Full Code Currently A0x3  Most likely in setting of sepsis and UTI  -Will monitor RESOLVED  Currently A0x3  Most likely in setting of sepsis and UTI  -Will monitor

## 2021-04-29 NOTE — PROGRESS NOTE ADULT - PROBLEM SELECTOR PLAN 2
Plan as above Mets 2/4 SIRS criteria   -C/w zosyn 4.5mg q6hr  -Trend WBC, fever curve  -Tylenol PRN for fever or pain   -F/u micro studies- UC and BC and change abx based on sensitivities - pending  -Trend lactate- 1.7 AM level on 4/29 likely in setting of stress  - F/u troponin   - F/u CK, CKMB  - F/u repeat EKG likely in setting of stress; patient denies CP  - F/u troponin   - CK, CKMB wnl  - repeat EKG w/ no ST elevations or depression

## 2021-04-29 NOTE — H&P ADULT - PROBLEM SELECTOR PLAN 1
Mets 2/4 SIRS criteria   -Start zosyn 3.375 [id number]  -Trend WBC, fever curve  -Tylenol PRN for fever or pain   -F/u micro studies- UC and BC and change abx based on sensitivities   -Trend lactate- f/u AM level

## 2021-04-29 NOTE — PROGRESS NOTE ADULT - ATTENDING COMMENTS
agree with assessment and plan as documented by resident and student.  --f/u urine cx; continue zosyn today but concerned as wbc increasing today  --f/u diff for neutrophils and bandemia  --repeat EKG and trops   --monitor thrombocytopenia, likely 2/2 sepsis   --AMS improved and patient now a&ox3

## 2021-04-30 ENCOUNTER — TRANSCRIPTION ENCOUNTER (OUTPATIENT)
Age: 71
End: 2021-04-30

## 2021-04-30 VITALS
HEART RATE: 74 BPM | TEMPERATURE: 98 F | SYSTOLIC BLOOD PRESSURE: 127 MMHG | OXYGEN SATURATION: 98 % | DIASTOLIC BLOOD PRESSURE: 84 MMHG | RESPIRATION RATE: 16 BRPM

## 2021-04-30 LAB
ALBUMIN SERPL ELPH-MCNC: 3 G/DL — LOW (ref 3.3–5)
ALP SERPL-CCNC: 89 U/L — SIGNIFICANT CHANGE UP (ref 40–120)
ALT FLD-CCNC: 67 U/L — HIGH (ref 10–45)
ANION GAP SERPL CALC-SCNC: 11 MMOL/L — SIGNIFICANT CHANGE UP (ref 5–17)
AST SERPL-CCNC: 97 U/L — HIGH (ref 10–40)
BILIRUB SERPL-MCNC: 0.9 MG/DL — SIGNIFICANT CHANGE UP (ref 0.2–1.2)
BUN SERPL-MCNC: 13 MG/DL — SIGNIFICANT CHANGE UP (ref 7–23)
CALCIUM SERPL-MCNC: 9.1 MG/DL — SIGNIFICANT CHANGE UP (ref 8.4–10.5)
CHLORIDE SERPL-SCNC: 108 MMOL/L — SIGNIFICANT CHANGE UP (ref 96–108)
CO2 SERPL-SCNC: 21 MMOL/L — LOW (ref 22–31)
CREAT SERPL-MCNC: 1.15 MG/DL — SIGNIFICANT CHANGE UP (ref 0.5–1.3)
GLUCOSE SERPL-MCNC: 99 MG/DL — SIGNIFICANT CHANGE UP (ref 70–99)
HCT VFR BLD CALC: 39.7 % — SIGNIFICANT CHANGE UP (ref 39–50)
HCV AB S/CO SERPL IA: 0.04 S/CO — LOW
HCV AB SERPL-IMP: SIGNIFICANT CHANGE UP
HGB BLD-MCNC: 13.1 G/DL — SIGNIFICANT CHANGE UP (ref 13–17)
MAGNESIUM SERPL-MCNC: 2 MG/DL — SIGNIFICANT CHANGE UP (ref 1.6–2.6)
MCHC RBC-ENTMCNC: 30.2 PG — SIGNIFICANT CHANGE UP (ref 27–34)
MCHC RBC-ENTMCNC: 33 GM/DL — SIGNIFICANT CHANGE UP (ref 32–36)
MCV RBC AUTO: 91.5 FL — SIGNIFICANT CHANGE UP (ref 80–100)
NRBC # BLD: 0 /100 WBCS — SIGNIFICANT CHANGE UP (ref 0–0)
PHOSPHATE SERPL-MCNC: 1.9 MG/DL — LOW (ref 2.5–4.5)
PLATELET # BLD AUTO: 109 K/UL — LOW (ref 150–400)
POTASSIUM SERPL-MCNC: 3.8 MMOL/L — SIGNIFICANT CHANGE UP (ref 3.5–5.3)
POTASSIUM SERPL-SCNC: 3.8 MMOL/L — SIGNIFICANT CHANGE UP (ref 3.5–5.3)
PROT SERPL-MCNC: 5.9 G/DL — LOW (ref 6–8.3)
RBC # BLD: 4.34 M/UL — SIGNIFICANT CHANGE UP (ref 4.2–5.8)
RBC # FLD: 13.4 % — SIGNIFICANT CHANGE UP (ref 10.3–14.5)
SODIUM SERPL-SCNC: 140 MMOL/L — SIGNIFICANT CHANGE UP (ref 135–145)
WBC # BLD: 14.3 K/UL — HIGH (ref 3.8–10.5)
WBC # FLD AUTO: 14.3 K/UL — HIGH (ref 3.8–10.5)

## 2021-04-30 PROCEDURE — 82550 ASSAY OF CK (CPK): CPT

## 2021-04-30 PROCEDURE — 85730 THROMBOPLASTIN TIME PARTIAL: CPT

## 2021-04-30 PROCEDURE — 87040 BLOOD CULTURE FOR BACTERIA: CPT

## 2021-04-30 PROCEDURE — 83605 ASSAY OF LACTIC ACID: CPT

## 2021-04-30 PROCEDURE — 93005 ELECTROCARDIOGRAM TRACING: CPT

## 2021-04-30 PROCEDURE — 86803 HEPATITIS C AB TEST: CPT

## 2021-04-30 PROCEDURE — 85027 COMPLETE CBC AUTOMATED: CPT

## 2021-04-30 PROCEDURE — 36415 COLL VENOUS BLD VENIPUNCTURE: CPT

## 2021-04-30 PROCEDURE — 99239 HOSP IP/OBS DSCHRG MGMT >30: CPT

## 2021-04-30 PROCEDURE — 82553 CREATINE MB FRACTION: CPT

## 2021-04-30 PROCEDURE — 84484 ASSAY OF TROPONIN QUANT: CPT

## 2021-04-30 PROCEDURE — 81001 URINALYSIS AUTO W/SCOPE: CPT

## 2021-04-30 PROCEDURE — 85025 COMPLETE CBC W/AUTO DIFF WBC: CPT

## 2021-04-30 PROCEDURE — 84100 ASSAY OF PHOSPHORUS: CPT

## 2021-04-30 PROCEDURE — 87086 URINE CULTURE/COLONY COUNT: CPT

## 2021-04-30 PROCEDURE — 80053 COMPREHEN METABOLIC PANEL: CPT

## 2021-04-30 PROCEDURE — 87635 SARS-COV-2 COVID-19 AMP PRB: CPT

## 2021-04-30 PROCEDURE — 83735 ASSAY OF MAGNESIUM: CPT

## 2021-04-30 PROCEDURE — 74176 CT ABD & PELVIS W/O CONTRAST: CPT

## 2021-04-30 PROCEDURE — 85610 PROTHROMBIN TIME: CPT

## 2021-04-30 PROCEDURE — 71045 X-RAY EXAM CHEST 1 VIEW: CPT

## 2021-04-30 PROCEDURE — 99285 EMERGENCY DEPT VISIT HI MDM: CPT

## 2021-04-30 RX ORDER — CEFPODOXIME PROXETIL 100 MG
1 TABLET ORAL
Qty: 12 | Refills: 0
Start: 2021-04-30 | End: 2021-05-05

## 2021-04-30 RX ORDER — SODIUM,POTASSIUM PHOSPHATES 278-250MG
1 POWDER IN PACKET (EA) ORAL ONCE
Refills: 0 | Status: COMPLETED | OUTPATIENT
Start: 2021-04-30 | End: 2021-04-30

## 2021-04-30 RX ORDER — POTASSIUM PHOSPHATE, MONOBASIC POTASSIUM PHOSPHATE, DIBASIC 236; 224 MG/ML; MG/ML
15 INJECTION, SOLUTION INTRAVENOUS ONCE
Refills: 0 | Status: DISCONTINUED | OUTPATIENT
Start: 2021-04-30 | End: 2021-04-30

## 2021-04-30 RX ORDER — CEFPODOXIME PROXETIL 100 MG
200 TABLET ORAL EVERY 12 HOURS
Refills: 0 | Status: DISCONTINUED | OUTPATIENT
Start: 2021-04-30 | End: 2021-04-30

## 2021-04-30 RX ADMIN — ENOXAPARIN SODIUM 40 MILLIGRAM(S): 100 INJECTION SUBCUTANEOUS at 12:10

## 2021-04-30 RX ADMIN — Medication 1 TABLET(S): at 13:10

## 2021-04-30 RX ADMIN — PIPERACILLIN AND TAZOBACTAM 200 GRAM(S): 4; .5 INJECTION, POWDER, LYOPHILIZED, FOR SOLUTION INTRAVENOUS at 05:53

## 2021-04-30 RX ADMIN — PIPERACILLIN AND TAZOBACTAM 200 GRAM(S): 4; .5 INJECTION, POWDER, LYOPHILIZED, FOR SOLUTION INTRAVENOUS at 12:12

## 2021-04-30 RX ADMIN — FINASTERIDE 5 MILLIGRAM(S): 5 TABLET, FILM COATED ORAL at 12:11

## 2021-04-30 RX ADMIN — Medication 1 TABLET(S): at 15:18

## 2021-04-30 NOTE — DISCHARGE NOTE PROVIDER - NSDCMRMEDTOKEN_GEN_ALL_CORE_FT
finasteride 5 mg oral tablet: 1 tab(s) orally once a day   cefpodoxime 200 mg oral tablet: 1 tab(s) orally every 12 hours    [id number]  [facility]  7613-02  finasteride 5 mg oral tablet: 1 tab(s) orally once a day

## 2021-04-30 NOTE — DISCHARGE NOTE PROVIDER - HOSPITAL COURSE
#Discharge: do not delete    Mr. Bass is a 69 y/o M with PMHx of BPH who pw fever, AMS, found to be septic 2/2 UTI, admitted for IV antibiotics.    Problem List/Inpatient treatment course:     #Sepsis.    - met 2/4 SIRS criteria on admisson  - s/p zosyn 4.5mg q6hr  - Tylenol PRN for fever or pain   - lactate resolved (1.7 AM level on 4/29).  - Urine, blood cultures sent; results pending  - c/w cefpodoxime 200mg BID for 12 more doses    #Elevated troponin  - likely in setting of stress; patient denies CP  - troponin peaked  - CK, CKMB wnl  - repeat EKG w/ no ST elevations or depression.    #Thrombocytopenia.    - possibly acute i/s/o sepsis vs side effect of bactrim (was on bactrim right before admission)  - continue to monitor for now, improving  - f/u outpatient w/ PCP    #Transaminitis  - mildly elevated AST/ALT  - likely i/s/o sepsis  - continue to monitor, f/u outpatient w/ PCP    #LANNY (acute kidney injury).  post-renal in setting of enlarged prostate and decreased UOP vs pre-renal in setting of sepsis  - Resolved LANNY  - Strict Is/Os    #BPH  - Continue finasteride 5 mg daily  - f/u outpatient w/ urologist    New medications: cefpodoxime 200mg BID for 12 more doses  Labs to be followed outpatient: none  Exam to be followed outpatient: none

## 2021-04-30 NOTE — DISCHARGE NOTE PROVIDER - PROVIDER TOKENS
FREE:[LAST:[Adonis],FIRST:[Miguel Angel],PHONE:[phone]],FAX:[(   )    -],ADDRESS:[[address] [place] [address]],SCHEDULEDAPPT:[05/14/2021],SCHEDULEDAPPTTIME:[11:15 AM],ESTABLISHEDPATIENT:[T]] FREE:[LAST:[Adonis],FIRST:[Miguel Angel],PHONE:[phone]],FAX:[(   )    -],ADDRESS:[[address] [place] [address]],SCHEDULEDAPPT:[05/14/2021],SCHEDULEDAPPTTIME:[11:15 AM],ESTABLISHEDPATIENT:[T]],FREE:[LAST:[Olvin],FIRST:[Dash],PHONE:[phone]],FAX:[(   )    -],ADDRESS:[[address] [place] [id number]],SCHEDULEDAPPT:[05/20/2021],SCHEDULEDAPPTTIME:[02:15 PM],ESTABLISHEDPATIENT:[T]]

## 2021-04-30 NOTE — DISCHARGE NOTE PROVIDER - NSDCCPCAREPLAN_GEN_ALL_CORE_FT
PRINCIPAL DISCHARGE DIAGNOSIS  Diagnosis: Sepsis secondary to UTI  Assessment and Plan of Treatment: You were admitted for fever, and were found to have urinary tract infection. We treated you with intravenous antibiotics, and monitored your labs and symptoms. You improved, so you can be switched to oral antibiotics, please take cefpodoxime every 12 hours for 6 more days. Please make sure to take all pills, even if your symptoms have resolved, to prevent recurrence and the development of bacterial resistance.  Please follow up with your urologist on 5/14 at 11:15 am.      SECONDARY DISCHARGE DIAGNOSES  Diagnosis: Transaminitis  Assessment and Plan of Treatment: Your liver enzymes were noted to be elevated; likely in the setting of infection. Please follow up with your primary care provider on 5/20 at 2:15 to monitor your liver enzymes, and for further workup if they remain elevated.     PRINCIPAL DISCHARGE DIAGNOSIS  Diagnosis: Sepsis secondary to UTI  Assessment and Plan of Treatment: You were admitted for fever, and were found to have urinary tract infection. We treated you with intravenous antibiotics, and monitored your labs and symptoms. You improved, so you can be switched to oral antibiotics, please take cefpodoxime every 12 hours for 6 more days. Please make sure to take all pills, even if your symptoms have resolved, to prevent recurrence and the development of bacterial resistance.  Please follow up with your urologist on 5/14 at 11:15 am.        SECONDARY DISCHARGE DIAGNOSES  Diagnosis: Transaminitis  Assessment and Plan of Treatment: Your liver enzymes were noted to be elevated; likely in the setting of infection. Please follow up with your primary care provider on 5/20 at 2:15 to monitor your liver enzymes, and for further workup if they remain elevated.

## 2021-04-30 NOTE — DISCHARGE NOTE NURSING/CASE MANAGEMENT/SOCIAL WORK - PATIENT PORTAL LINK FT
You can access the FollowMyHealth Patient Portal offered by [facility] by registering at the following website: [email or web address]. By joining [facility]’s FollowMyHealth portal, you will also be able to view your health information using other applications (apps) compatible with our system.

## 2021-05-03 ENCOUNTER — APPOINTMENT (OUTPATIENT)
Dept: UROLOGY | Facility: CLINIC | Age: 71
End: 2021-05-03
Payer: MEDICARE

## 2021-05-03 VITALS
BODY MASS INDEX: 30.22 KG/M2 | HEART RATE: 95 BPM | SYSTOLIC BLOOD PRESSURE: 137 MMHG | WEIGHT: 188 LBS | HEIGHT: 66 IN | OXYGEN SATURATION: 97 % | DIASTOLIC BLOOD PRESSURE: 90 MMHG | TEMPERATURE: 96.6 F

## 2021-05-03 DIAGNOSIS — Z87.891 PERSONAL HISTORY OF NICOTINE DEPENDENCE: ICD-10-CM

## 2021-05-03 DIAGNOSIS — Z84.2 FAMILY HISTORY OF OTHER DISEASES OF THE GENITOURINARY SYSTEM: ICD-10-CM

## 2021-05-03 DIAGNOSIS — N13.8 BENIGN PROSTATIC HYPERPLASIA WITH LOWER URINARY TRACT SYMPMS: ICD-10-CM

## 2021-05-03 DIAGNOSIS — N40.1 BENIGN PROSTATIC HYPERPLASIA WITH LOWER URINARY TRACT SYMPMS: ICD-10-CM

## 2021-05-03 DIAGNOSIS — Z87.898 PERSONAL HISTORY OF OTHER SPECIFIED CONDITIONS: ICD-10-CM

## 2021-05-03 PROBLEM — Z00.00 ENCOUNTER FOR PREVENTIVE HEALTH EXAMINATION: Noted: 2021-05-03

## 2021-05-03 PROCEDURE — 99204 OFFICE O/P NEW MOD 45 MIN: CPT

## 2021-05-03 PROCEDURE — 51798 US URINE CAPACITY MEASURE: CPT

## 2021-05-04 LAB
APPEARANCE: ABNORMAL
BACTERIA: NEGATIVE
BILIRUBIN URINE: NEGATIVE
BLOOD URINE: NEGATIVE
COLOR: YELLOW
CULTURE RESULTS: SIGNIFICANT CHANGE UP
CULTURE RESULTS: SIGNIFICANT CHANGE UP
GLUCOSE QUALITATIVE U: NEGATIVE
HYALINE CASTS: 0 /LPF
KETONES URINE: NEGATIVE
LEUKOCYTE ESTERASE URINE: NEGATIVE
MICROSCOPIC-UA: NORMAL
NITRITE URINE: NEGATIVE
PH URINE: 6
PROTEIN URINE: ABNORMAL
RED BLOOD CELLS URINE: 2 /HPF
SPECIFIC GRAVITY URINE: 1.03
SPECIMEN SOURCE: SIGNIFICANT CHANGE UP
SPECIMEN SOURCE: SIGNIFICANT CHANGE UP
SQUAMOUS EPITHELIAL CELLS: >27 /HPF
URINE COMMENTS: NORMAL
UROBILINOGEN URINE: NORMAL
WHITE BLOOD CELLS URINE: 5 /HPF

## 2021-05-05 DIAGNOSIS — R77.8 OTHER SPECIFIED ABNORMALITIES OF PLASMA PROTEINS: ICD-10-CM

## 2021-05-05 DIAGNOSIS — R74.01 ELEVATION OF LEVELS OF LIVER TRANSAMINASE LEVELS: ICD-10-CM

## 2021-05-05 DIAGNOSIS — N17.9 ACUTE KIDNEY FAILURE, UNSPECIFIED: ICD-10-CM

## 2021-05-05 DIAGNOSIS — R65.20 SEVERE SEPSIS WITHOUT SEPTIC SHOCK: ICD-10-CM

## 2021-05-05 DIAGNOSIS — D69.6 THROMBOCYTOPENIA, UNSPECIFIED: ICD-10-CM

## 2021-05-05 DIAGNOSIS — A41.9 SEPSIS, UNSPECIFIED ORGANISM: ICD-10-CM

## 2021-05-05 DIAGNOSIS — N39.0 URINARY TRACT INFECTION, SITE NOT SPECIFIED: ICD-10-CM

## 2021-05-05 DIAGNOSIS — N40.0 BENIGN PROSTATIC HYPERPLASIA WITHOUT LOWER URINARY TRACT SYMPTOMS: ICD-10-CM

## 2021-05-05 DIAGNOSIS — G93.41 METABOLIC ENCEPHALOPATHY: ICD-10-CM

## 2021-05-05 PROBLEM — Z84.2 FAMILY HISTORY OF BENIGN PROSTATIC HYPERPLASIA: Status: ACTIVE | Noted: 2021-05-05

## 2021-05-05 PROBLEM — Z87.891 FORMER SMOKER: Status: ACTIVE | Noted: 2021-05-05

## 2021-05-05 PROBLEM — Z87.898 HISTORY OF URINARY RETENTION: Status: RESOLVED | Noted: 2021-05-05 | Resolved: 2021-05-05

## 2021-05-05 LAB — BACTERIA UR CULT: NORMAL

## 2021-05-05 RX ORDER — DUTASTERIDE 0.5 MG/1
CAPSULE, LIQUID FILLED ORAL
Refills: 0 | Status: ACTIVE | COMMUNITY

## 2021-05-05 NOTE — ASSESSMENT
[FreeTextEntry1] : 71 yo male with BPH.\par \par Lower urinary symptoms were reviewed including the potential etiologies of the patient's symptoms. The anatomy of the urinary tract was reviewed. Bladder, prostate, and urethral sources, as well as non-urologic sources, were discussed. Options for evaluation were discussed including cystoscopy, urodynamics, and pelvis ultrasonography. Management of symptoms were reviewed including no therapy, medical therapy, and surgical therapy. The long term sequelae of no treatment, including no adverse effects, potential for progressive lower urinary tract symptoms or deterioration, urinary retention, bladder dysfunction, and renal dysfunction were reviewed. \par \par Medical therapy for BPH (benign prostatic hyperplasia), or prostate enlargement, was reviewed including the physiology of medication therapy. Alpha blocker medication therapy was reviewed including adverse effects (including dizziness, hypotension, retrograde ejaculation, rhinitis, fatigue), proper usage, and contraindications. The use of 5 alpha reductase inhibitor medication therapy was reviewed including adverse effects (including decreased libido, erectile dysfunction, fatigue, reduction of PSA level), proper usage, and contraindications. Potential increased risk of high grade prostate malignancy was reviewed with 5 alpha reductase inhibitor medication. Combination medication therapy with alpha blocker and 5 alpha reductase inhibitor therapy was reviewed. \par \par Surgical options for BPH were discussed including Rezum, Urolift, laser therapies, TURP, and simple prostatectomy. Risks of surgery were reviewed.\par \par I will need to obtain his previous urologic records to review any prior PSA, MR, and biopsy results before I can recommend any specific treatments for his BPH. \par \par I will call his primary urologist's office and have them fax the available records.\par

## 2021-05-05 NOTE — PHYSICAL EXAM
[General Appearance - Well Developed] : well developed [Normal Appearance] : normal appearance [General Appearance - In No Acute Distress] : no acute distress [Heart Rate And Rhythm] : Heart rate and rhythm were normal [] : no respiratory distress [Abdomen Soft] : soft [Penis Abnormality] : normal uncircumcised penis [Prostate Tenderness] : the prostate was not tender [No Prostate Nodules] : no prostate nodules [Prostate Size ___ (0-4)] : prostate size [unfilled] (scale: 0-4) [Normal Station and Gait] : the gait and station were normal for the patient's age [Skin Color & Pigmentation] : normal skin color and pigmentation [No Focal Deficits] : no focal deficits [Oriented To Time, Place, And Person] : oriented to person, place, and time [FreeTextEntry1] : left sided scrotal tenderness possible resolving orchitis

## 2021-05-05 NOTE — HISTORY OF PRESENT ILLNESS
[FreeTextEntry1] : 71 yo male referred for hx of BPH.  He does not have any of his previous urologic records with him today, but per the patient, he has been treated for many years for BPH.  He has been on and off dutasteride.  His PSA has been elevated for many years ranging between 7 and 14.  Per the patient he has had 2 prior prostate biopsies and MRI in the last few years.  Imaging and biopsies were benign.  7 years ago he went into urinary retention and was able to void after the Ambrosio was removed.  He again went into urinary retention 10 days ago.  His Ambrosio was removed last Monday and then represented to [facility] ED Wednesday with fever and flu like symptoms.  He was diagnosed with urosepsis and admitted to [facility] for treatment.  His blood and Ucx were negative.  He was discharged on cefpodoxime.  He was able to void through out the whole infectious episode.  \par \par His primary urologist recommended that he have a GLPVP.  Per the patient, he was told he had a 90 gram prostate.  CT measurement of his prostate is slightly larger.  \par \par PVR = 10 cc

## 2021-05-13 ENCOUNTER — APPOINTMENT (OUTPATIENT)
Dept: UROLOGY | Facility: CLINIC | Age: 71
End: 2021-05-13

## 2021-06-03 ENCOUNTER — APPOINTMENT (OUTPATIENT)
Dept: UROLOGY | Facility: CLINIC | Age: 71
End: 2021-06-03

## 2021-12-27 ENCOUNTER — FOLLOW UP (OUTPATIENT)
Dept: URBAN - METROPOLITAN AREA CLINIC 19 | Facility: CLINIC | Age: 71
End: 2021-12-27

## 2021-12-27 DIAGNOSIS — H35.362: ICD-10-CM

## 2021-12-27 DIAGNOSIS — H35.371: ICD-10-CM

## 2021-12-27 DIAGNOSIS — H35.412: ICD-10-CM

## 2021-12-27 DIAGNOSIS — H33.021: ICD-10-CM

## 2021-12-27 DIAGNOSIS — H43.812: ICD-10-CM

## 2021-12-27 PROCEDURE — 92201 OPSCPY EXTND RTA DRAW UNI/BI: CPT

## 2021-12-27 PROCEDURE — 92014 COMPRE OPH EXAM EST PT 1/>: CPT

## 2021-12-27 PROCEDURE — 92134 CPTRZ OPH DX IMG PST SGM RTA: CPT

## 2021-12-27 ASSESSMENT — VISUAL ACUITY
OD_PH: 20/40-3
OD_CC: 20/60-3
OS_CC: 20/20-3

## 2021-12-27 ASSESSMENT — TONOMETRY
OD_IOP_MMHG: 16
OS_IOP_MMHG: 16

## 2022-04-24 RX ORDER — FINASTERIDE 5 MG/1
1 TABLET, FILM COATED ORAL
Qty: 0 | Refills: 0 | DISCHARGE

## 2022-05-09 NOTE — DISCHARGE NOTE NURSING/CASE MANAGEMENT/SOCIAL WORK - NSPROEXTENSIONSOFSELF_GEN_A_NUR
[address] none

## 2023-06-26 ENCOUNTER — FOLLOW UP (OUTPATIENT)
Dept: URBAN - METROPOLITAN AREA CLINIC 19 | Facility: CLINIC | Age: 73
End: 2023-06-26

## 2023-06-26 DIAGNOSIS — H35.371: ICD-10-CM

## 2023-06-26 DIAGNOSIS — H35.412: ICD-10-CM

## 2023-06-26 DIAGNOSIS — H33.021: ICD-10-CM

## 2023-06-26 DIAGNOSIS — H35.362: ICD-10-CM

## 2023-06-26 PROCEDURE — 92014 COMPRE OPH EXAM EST PT 1/>: CPT

## 2023-06-26 PROCEDURE — 92134 CPTRZ OPH DX IMG PST SGM RTA: CPT

## 2023-06-26 PROCEDURE — 92201 OPSCPY EXTND RTA DRAW UNI/BI: CPT

## 2023-06-26 ASSESSMENT — VISUAL ACUITY
OD_CC: 20/100-2
OS_CC: 20/20-2

## 2023-06-26 ASSESSMENT — TONOMETRY
OD_IOP_MMHG: 12
OS_IOP_MMHG: 14

## 2023-07-18 NOTE — ED ADULT NURSE NOTE - PAIN: PRESENCE, MLM
denies pain/discomfort Calcipotriene Counseling:  I discussed with the patient the risks of calcipotriene including but not limited to erythema, scaling, itching, and irritation.

## 2024-01-04 ENCOUNTER — FOLLOW UP (OUTPATIENT)
Dept: URBAN - METROPOLITAN AREA CLINIC 19 | Facility: CLINIC | Age: 74
End: 2024-01-04

## 2024-01-04 DIAGNOSIS — H35.341: ICD-10-CM

## 2024-01-04 DIAGNOSIS — H33.021: ICD-10-CM

## 2024-01-04 DIAGNOSIS — H35.371: ICD-10-CM

## 2024-01-04 DIAGNOSIS — H35.362: ICD-10-CM

## 2024-01-04 DIAGNOSIS — H35.412: ICD-10-CM

## 2024-01-04 PROCEDURE — 92014 COMPRE OPH EXAM EST PT 1/>: CPT

## 2024-01-04 PROCEDURE — 92201 OPSCPY EXTND RTA DRAW UNI/BI: CPT

## 2024-01-04 PROCEDURE — 92134 CPTRZ OPH DX IMG PST SGM RTA: CPT

## 2024-01-04 ASSESSMENT — VISUAL ACUITY
OD_CC: 20/80-2
OS_CC: 20/25+3

## 2024-01-04 ASSESSMENT — TONOMETRY
OS_IOP_MMHG: 14
OD_IOP_MMHG: 14
